# Patient Record
Sex: MALE | Race: WHITE | Employment: STUDENT | URBAN - METROPOLITAN AREA
[De-identification: names, ages, dates, MRNs, and addresses within clinical notes are randomized per-mention and may not be internally consistent; named-entity substitution may affect disease eponyms.]

---
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
## 2023-12-03 ENCOUNTER — OFFICE VISIT (OUTPATIENT)
Dept: ORTHOPEDICS | Facility: CLINIC | Age: 23
End: 2023-12-03
Payer: COMMERCIAL

## 2023-12-03 VITALS — WEIGHT: 285 LBS | HEIGHT: 78 IN | BODY MASS INDEX: 32.97 KG/M2

## 2023-12-03 DIAGNOSIS — M79.645 FINGER PAIN, LEFT: Primary | ICD-10-CM

## 2023-12-03 NOTE — LETTER
"  12/3/2023      RE: Jesus Molina  [address] [facility] [address]     Dear Colleague,    Thank you for referring your patient, Jesus Molina, to the [facility]. Please see a copy of my visit note below.    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 He felt the finger \"pop out and pop back in\" during practice today and points to the PIP joint. He remembers a similar incident to the same finger/same joint several years ago and has had some swelling/visible deformity since that injury. He believes the finger looks equivalent to this baseline deformity now.     PE:   Left hand  Small finger with radial sided swelling over the PIP joint.   Able to make a full fist without any overlapping digits.   Tender over the small finger PIP joint.   Pain with stress testing of coronal plane stability.   NVI      Imaging:   Mini c-arm used to take AP and lateral of the left small finger.   These demonstrate concentrically reduced DIP and PIP joints without any fractures. Soft tissue deformity seen over radial aspect of PIP joint.       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 He is ok to return to training and practice as he feels able to. Recommend eliceo taping of the small finger to the ring finger to protect the finger and prevent recurrent dislocations or episodes of instability.       Davida Theodore MD   Sports Fellow        Patient seen and examined with the fellow. I also personally reviewed the images and interpreted the imaging myself.     Assesment: left small digit instability    Plan: tape, splint, brace, play as able    I agree with history, physical and imaging as well as the assessment and plan as detailed by Dr. Theodore.       Again, thank you for allowing me to participate in the care of your patient.  "     Sincerely,    Rizwan Gilliland MD

## 2023-12-03 NOTE — PROGRESS NOTES
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 He felt the finger \"pop out and pop back in\" during practice today and points to the PIP joint. He remembers a similar incident to the same finger/same joint several years ago and has had some swelling/visible deformity since that injury. He believes the finger looks equivalent to this baseline deformity now.     PE:   Left hand  Small finger with radial sided swelling over the PIP joint.   Able to make a full fist without any overlapping digits.   Tender over the small finger PIP joint.   Pain with stress testing of coronal plane stability.   NVI      Imaging:   Mini c-arm used to take AP and lateral of the left small finger.   These demonstrate concentrically reduced DIP and PIP joints without any fractures. Soft tissue deformity seen over radial aspect of PIP joint.       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 He is ok to return to training and practice as he feels able to. Recommend eliceo taping of the small finger to the ring finger to protect the finger and prevent recurrent dislocations or episodes of instability.       Davida Theodore MD   Sports Fellow      "

## 2023-12-04 NOTE — PROGRESS NOTES
Patient seen and examined with the fellow. I also personally reviewed the images and interpreted the imaging myself.     Assesment: left small digit instability    Plan: tape, splint, brace, play as able    I agree with history, physical and imaging as well as the assessment and plan as detailed by Dr. Theodore.

## 2024-07-29 ENCOUNTER — TELEPHONE (OUTPATIENT)
Dept: ORTHOPEDICS | Facility: CLINIC | Age: 24
End: 2024-07-29

## 2024-07-29 DIAGNOSIS — R11.2 NAUSEA AND VOMITING, UNSPECIFIED VOMITING TYPE: Primary | ICD-10-CM

## 2024-07-29 RX ORDER — ONDANSETRON 4 MG/1
4 TABLET, ORALLY DISINTEGRATING ORAL EVERY 6 HOURS PRN
Qty: 15 TABLET | Refills: 0 | Status: SHIPPED | OUTPATIENT
Start: 2024-07-29 | End: 2024-08-05

## 2024-08-18 ENCOUNTER — ANCILLARY PROCEDURE (OUTPATIENT)
Dept: MRI IMAGING | Facility: CLINIC | Age: 24
End: 2024-08-18
Attending: ORTHOPAEDIC SURGERY
Payer: COMMERCIAL

## 2024-08-18 ENCOUNTER — ANCILLARY PROCEDURE (OUTPATIENT)
Dept: GENERAL RADIOLOGY | Facility: CLINIC | Age: 24
End: 2024-08-18
Attending: ORTHOPAEDIC SURGERY
Payer: COMMERCIAL

## 2024-08-18 DIAGNOSIS — M25.572 ACUTE LEFT ANKLE PAIN: ICD-10-CM

## 2024-08-18 DIAGNOSIS — M25.579 PAIN IN JOINT, ANKLE AND FOOT: ICD-10-CM

## 2024-08-18 DIAGNOSIS — M25.572 ACUTE LEFT ANKLE PAIN: Primary | ICD-10-CM

## 2024-08-18 DIAGNOSIS — M25.579 PAIN IN JOINT, ANKLE AND FOOT: Primary | ICD-10-CM

## 2024-08-18 PROCEDURE — 73721 MRI JNT OF LWR EXTRE W/O DYE: CPT | Mod: LT | Performed by: RADIOLOGY

## 2024-08-18 PROCEDURE — 73610 X-RAY EXAM OF ANKLE: CPT | Mod: LT | Performed by: RADIOLOGY

## 2024-08-18 PROCEDURE — 73590 X-RAY EXAM OF LOWER LEG: CPT | Mod: LT | Performed by: RADIOLOGY

## 2024-08-19 ENCOUNTER — OFFICE VISIT (OUTPATIENT)
Dept: ORTHOPEDICS | Facility: CLINIC | Age: 24
End: 2024-08-19
Payer: COMMERCIAL

## 2024-08-19 DIAGNOSIS — M25.579 PAIN IN JOINT INVOLVING ANKLE AND FOOT, UNSPECIFIED LATERALITY: Primary | ICD-10-CM

## 2024-08-19 RX ORDER — LIDOCAINE HYDROCHLORIDE 10 MG/ML
6 INJECTION, SOLUTION EPIDURAL; INFILTRATION; INTRACAUDAL; PERINEURAL
Status: SHIPPED | OUTPATIENT
Start: 2024-08-19

## 2024-08-19 RX ORDER — TRIAMCINOLONE ACETONIDE 40 MG/ML
40 INJECTION, SUSPENSION INTRA-ARTICULAR; INTRAMUSCULAR
Status: SHIPPED | OUTPATIENT
Start: 2024-08-19

## 2024-08-19 RX ADMIN — LIDOCAINE HYDROCHLORIDE 6 ML: 10 INJECTION, SOLUTION EPIDURAL; INFILTRATION; INTRACAUDAL; PERINEURAL at 15:17

## 2024-08-19 RX ADMIN — TRIAMCINOLONE ACETONIDE 40 MG: 40 INJECTION, SUSPENSION INTRA-ARTICULAR; INTRAMUSCULAR at 15:17

## 2024-08-19 NOTE — PROGRESS NOTES
Jesus Molina presents to the athletic training room today for evaluation of his left ankle.  He sustained an injury Saturday evening during the scrimmage.  I was able to review the video.  At a external rotation injury.  Was actually able to complete the practice who presented on Sunday.  We evaluated that time he was found to have significant tenderness up and down his fibula.  We elected to get radiographs and an MRI.  He returns to the clinic training room to discuss the results.    Examination of his left ankle today shows to be stable to varus valgus stress testing.  External rotation testing of the foot and ankle does reproduce his symptoms.  Drawer testing normal.  Fibula is not tender to palpation no tenderness the knee.    Plain radiographs show well reduced mortise.  No fractures.  Tib-fib films show no fractures.  MRI does show a high-grade injury to the syndesmotic ligaments both anterior, interosseous and posterior consistent with a high ankle sprain    Clinical assessment: Left ankle sprain    Plan: Long discussion with the athlete.  I did discuss with him the etiology of this injury as well as the treatment options.  Will start him on rehabilitation program through the training room.  Once he completes a functional return to play process we will allow him to return to sports when he can protect himself in the field and contribute effectively.    We did discuss the utilization of an injection as a way to help augment his recovery.  I discussed with him the pros cons he desired to proceed.    After informed consent was obtained under sterile technique 40 mg of Kenalog was injected to the point of maximal tenderness along the interosseous membrane through an anterior lateral approach.  Patient tolerated well.  Sterile dressings were applied.      Medium Joint Injection/Arthrocentesis: L ankle    Date/Time: 8/19/2024 3:17 PM    Performed by: Rizwan Gilliland MD  Authorized by: Talat  Rizwan Javier MD    Indications:  Pain  Needle Size:  21 G  Guidance: surface landmarks    Approach:  Anterolateral  Location:  Ankle  Site:  L ankle  Medications:  40 mg triamcinolone 40 MG/ML; 6 mL lidocaine (PF) 1 %  Outcome:  Tolerated well, no immediate complications  Consent Given by:  Patient  Timeout: timeout called immediately prior to procedure    Prep: patient was prepped and draped in usual sterile fashion      Addendum: Will initiate prescription for oral diclofenac.  This will take on a scheduled basis have also been given a prescription for Toradol tablets this is for game day use only.  He should not take diclofenac on these days.

## 2024-08-19 NOTE — LETTER
8/19/2024      RE: Jesus Molina  [address] [facility] [address]     Dear Colleague,    Thank you for referring your patient, Jesus Molina, to the [facility]. Please see a copy of my visit note below.    Jesus Molina presents to the athletic training room today for evaluation of his left ankle.  He sustained an injury Saturday evening during the scrimmage.  I was able to review the video.  At a external rotation injury.  Was actually able to complete the practice who presented on Sunday.  We evaluated that time he was found to have significant tenderness up and down his fibula.  We elected to get radiographs and an MRI.  He returns to the clinic training room to discuss the results.    Examination of his left ankle today shows to be stable to varus valgus stress testing.  External rotation testing of the foot and ankle does reproduce his symptoms.  Drawer testing normal.  Fibula is not tender to palpation no tenderness the knee.    Plain radiographs show well reduced mortise.  No fractures.  Tib-fib films show no fractures.  MRI does show a high-grade injury to the syndesmotic ligaments both anterior, interosseous and posterior consistent with a high ankle sprain    Clinical assessment: Left ankle sprain    Plan: Long discussion with the athlete.  I did discuss with him the etiology of this injury as well as the treatment options.  Will start him on rehabilitation program through the training room.  Once he completes a functional return to play process we will allow him to return to sports when he can protect himself in the field and contribute effectively.    We did discuss the utilization of an injection as a way to help augment his recovery.  I discussed with him the pros cons he desired to proceed.    After informed consent was obtained under sterile technique 40 mg of Kenalog was injected to the point of maximal tenderness along the interosseous membrane through  an anterior lateral approach.  Patient tolerated well.  Sterile dressings were applied.      Medium Joint Injection/Arthrocentesis: L ankle    Date/Time: 8/19/2024 3:17 PM    Performed by: Rizwan Gilliland MD  Authorized by: Rizwan Gilliland MD    Indications:  Pain  Needle Size:  21 G  Guidance: surface landmarks    Approach:  Anterolateral  Location:  Ankle  Site:  L ankle  Medications:  40 mg triamcinolone 40 MG/ML; 6 mL lidocaine (PF) 1 %  Outcome:  Tolerated well, no immediate complications  Consent Given by:  Patient  Timeout: timeout called immediately prior to procedure    Prep: patient was prepped and draped in usual sterile fashion          Again, thank you for allowing me to participate in the care of your patient.      Sincerely,    Rizwan Gilliland MD

## 2024-08-22 RX ORDER — DICLOFENAC SODIUM 75 MG/1
75 TABLET, DELAYED RELEASE ORAL 2 TIMES DAILY
Qty: 60 TABLET | Refills: 0 | Status: SHIPPED | OUTPATIENT
Start: 2024-08-22 | End: 2024-09-19

## 2024-08-22 RX ORDER — KETOROLAC TROMETHAMINE 10 MG/1
10 TABLET, FILM COATED ORAL EVERY 6 HOURS PRN
Qty: 6 TABLET | Refills: 0 | Status: SHIPPED | OUTPATIENT
Start: 2024-08-22

## 2024-08-29 ENCOUNTER — OFFICE VISIT (OUTPATIENT)
Dept: ORTHOPEDICS | Facility: CLINIC | Age: 24
End: 2024-08-29
Payer: COMMERCIAL

## 2024-08-29 DIAGNOSIS — M25.579 PAIN IN JOINT INVOLVING ANKLE AND FOOT, UNSPECIFIED LATERALITY: Primary | ICD-10-CM

## 2024-08-29 RX ADMIN — LIDOCAINE HYDROCHLORIDE 6 ML: 10 INJECTION, SOLUTION EPIDURAL; INFILTRATION; INTRACAUDAL; PERINEURAL at 14:17

## 2024-09-05 NOTE — PROGRESS NOTES
Jesus Molina presents to the athletic training room today for left ankle.  This is seen for a game day visit.  He has a high ankle sprain.  We previously discussed the possibility of doing a lidocaine injection around the point of maximal tenderness for his ankle.  He understands the pros cons risk and benefits of this injection.  Together through combined send making approach elected proceed.    After informed consent was obtained under sterile to the knee, lidocaine and Marcaine were injected the periarticular structures on the left ankle got into the ankle joint.  Tolerated this injection well.  Sterile dressings were applied.  He will take oral Toradol and we will plan to take.  He can follow-up with her  was necessary.    Medium Joint Injection/Arthrocentesis: L ankle    Date/Time: 8/29/2024 2:17 PM    Performed by: Rizwan Gilliland MD  Authorized by: Rizwan Gilliland MD    Indications:  Pain  Needle Size:  21 G  Location:  Ankle  Site:  L ankle  Medications:  6 mL lidocaine (PF) 1 %  Outcome:  Tolerated well, no immediate complications  Procedure discussed: discussed risks, benefits, and alternatives    Timeout: timeout called immediately prior to procedure    Prep: patient was prepped and draped in usual sterile fashion

## 2024-09-06 RX ORDER — LIDOCAINE HYDROCHLORIDE 10 MG/ML
6 INJECTION, SOLUTION EPIDURAL; INFILTRATION; INTRACAUDAL; PERINEURAL
Status: SHIPPED | OUTPATIENT
Start: 2024-08-29

## 2024-09-07 ENCOUNTER — OFFICE VISIT (OUTPATIENT)
Dept: ORTHOPEDICS | Facility: CLINIC | Age: 24
End: 2024-09-07
Payer: COMMERCIAL

## 2024-09-07 DIAGNOSIS — M25.572 PAIN OF JOINT OF LEFT ANKLE AND FOOT: Primary | ICD-10-CM

## 2024-09-07 RX ADMIN — BUPIVACAINE HYDROCHLORIDE 4 ML: 5 INJECTION, SOLUTION EPIDURAL; INTRACAUDAL at 07:39

## 2024-09-12 RX ORDER — BUPIVACAINE HYDROCHLORIDE 5 MG/ML
4 INJECTION, SOLUTION EPIDURAL; INTRACAUDAL
Status: SHIPPED | OUTPATIENT
Start: 2024-09-07

## 2024-09-12 NOTE — PROGRESS NOTES
Jesus Molina is a very pleasant 24-year-old male presents for a game day visit for a ankle injection.  We have been doing a left periarticular ankle injection on game days to allow him to play.  Previous imaging is shown high ankle sprain with no fractures.    Exam shows a stable ankle.  Neurovascular intact.  No signs of infection.    Clinical assessment left ankle sprain    Plan: Will consider periarticular injection    Informed consent was obtained under sterile technique Marcaine was injected to the area of maximal tenderness along the syndesmosis.  He is cleared to play.  He understands the risks.    Medium Joint Injection/Arthrocentesis: L ankle    Date/Time: 9/7/2024 7:39 AM    Performed by: Rizwan Gilliland MD  Authorized by: Rizwan Gilliland MD    Indications:  Pain  Needle Size:  21 G  Guidance: surface landmarks    Approach:  Lateral  Location:  Ankle  Site:  L ankle  Medications:  4 mL BUPivacaine (PF) 0.5 %  Procedure discussed: discussed risks, benefits, and alternatives    Timeout: timeout called immediately prior to procedure    Prep: patient was prepped and draped in usual sterile fashion

## 2024-09-14 ENCOUNTER — OFFICE VISIT (OUTPATIENT)
Dept: ORTHOPEDICS | Facility: CLINIC | Age: 24
End: 2024-09-14
Payer: COMMERCIAL

## 2024-09-14 DIAGNOSIS — S93.492D HIGH ANKLE SPRAIN OF LEFT LOWER EXTREMITY, SUBSEQUENT ENCOUNTER: Primary | ICD-10-CM

## 2024-09-14 RX ADMIN — BUPIVACAINE HYDROCHLORIDE 3 ML: 2.5 INJECTION, SOLUTION EPIDURAL; INFILTRATION; INTRACAUDAL at 12:37

## 2024-09-14 RX ADMIN — LIDOCAINE HYDROCHLORIDE 3 ML: 10 INJECTION, SOLUTION EPIDURAL; INFILTRATION; INTRACAUDAL; PERINEURAL at 12:37

## 2024-09-15 RX ORDER — BUPIVACAINE HYDROCHLORIDE 2.5 MG/ML
3 INJECTION, SOLUTION EPIDURAL; INFILTRATION; INTRACAUDAL
Status: SHIPPED | OUTPATIENT
Start: 2024-09-14 | End: 2024-09-14

## 2024-09-15 RX ORDER — LIDOCAINE HYDROCHLORIDE 10 MG/ML
3 INJECTION, SOLUTION EPIDURAL; INFILTRATION; INTRACAUDAL; PERINEURAL
Status: SHIPPED | OUTPATIENT
Start: 2024-09-14 | End: 2024-09-14

## 2024-09-15 NOTE — PROGRESS NOTES
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 He still has pain over the syndesmosis with high level athletic activity. He is interested in a pre-game syndesmotic lidocaine and marcaine injection..    Objective:   PE:  LLE: Pain to palpation over the syndesmotic insertion onto the anterior aspect of the fibula.  5/5 ankle plantar flex/dorsiflexion as well as eversion and inversion.  Mild pain with external rotation test.  Sensation tact superficial peroneal, deep peroneal, tibial nerve distribution the foot.    Procedure:   Verbal informed consent for syndesmotic injection was obtained from the patient after discussing the risk and benefits of the procedure.  Risk and benefits including but not limited to bleeding, infection, failure to cure pain and allergic reaction were discussed.  The syndesmotic insertion on the anterior aspect of the fibula was identified by palpation of bony landmarks.  The skin was cleaned with iodine solution.  Under sterile technique the anterior border the fibula was palpated.  The knee was brought down to the anterior surface of the fibula.  And the the entirety of the lidocaine and bupivicaine solution was injected.  Soft dressings were applied.  Patient tolerated the procedure without difficulty.  I counseled the patient on signs and symptoms of allergic reaction and infection.     Medium Joint Injection/Arthrocentesis: L ankle    Date/Time: 9/14/2024 12:37 PM    Performed by: Rafael Herrera MD  Authorized by: Rafael Herrera MD    Indications:  Pain  Needle Size:  21 G  Guidance: surface landmarks    Approach:  Anterolateral  Location:  Ankle  Site:  L ankle  Medications:  3 mL BUPivacaine HCl (PF) 0.25 %; 3 mL lidocaine (PF) 1 %  Outcome:  Tolerated well, no immediate complications  Procedure discussed: discussed risks, benefits, and alternatives    Timeout: timeout called immediately prior  to procedure    Prep: patient was prepped and draped in usual sterile fashion        A/P:  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 He continues to have pain over the syndesmosis effecting his ability to perform at a high level.   We discussed the risks and benefits of pregame local anesthetic injection, specifically allergic reaction, failure to cure pain, and infection.  I counseled him on the signs and symptoms of allergic reaction and infection.  I the opportunity answer his questions.  Procedure was performed as highlighted above in the training room.  He tolerated the procedure without difficulty.   He is cleared for full participation.    Rafael Herrera MD    [facility]   Department of Orthopedic Surgery

## 2024-09-19 DIAGNOSIS — M25.579 PAIN IN JOINT INVOLVING ANKLE AND FOOT, UNSPECIFIED LATERALITY: ICD-10-CM

## 2024-09-19 RX ORDER — DICLOFENAC SODIUM 75 MG/1
75 TABLET, DELAYED RELEASE ORAL 2 TIMES DAILY
Qty: 60 TABLET | Refills: 0 | Status: SHIPPED | OUTPATIENT
Start: 2024-09-19

## 2024-09-21 ENCOUNTER — APPOINTMENT (OUTPATIENT)
Dept: ORTHOPEDICS | Facility: CLINIC | Age: 24
End: 2024-09-21
Payer: COMMERCIAL

## 2024-09-28 ENCOUNTER — OFFICE VISIT (OUTPATIENT)
Dept: ORTHOPEDICS | Facility: CLINIC | Age: 24
End: 2024-09-28
Payer: COMMERCIAL

## 2024-09-28 DIAGNOSIS — M25.562 CHRONIC PAIN OF LEFT KNEE: Primary | ICD-10-CM

## 2024-09-28 DIAGNOSIS — G89.29 CHRONIC PAIN OF LEFT KNEE: Primary | ICD-10-CM

## 2024-09-28 RX ADMIN — BUPIVACAINE HYDROCHLORIDE 4 ML: 5 INJECTION, SOLUTION EPIDURAL; INTRACAUDAL at 08:02

## 2024-09-28 RX ADMIN — LIDOCAINE HYDROCHLORIDE 3 ML: 10 INJECTION, SOLUTION EPIDURAL; INFILTRATION; INTRACAUDAL; PERINEURAL at 08:02

## 2024-09-30 RX ORDER — BUPIVACAINE HYDROCHLORIDE 5 MG/ML
4 INJECTION, SOLUTION EPIDURAL; INTRACAUDAL
Status: SHIPPED | OUTPATIENT
Start: 2024-09-28

## 2024-09-30 RX ORDER — LIDOCAINE HYDROCHLORIDE 10 MG/ML
3 INJECTION, SOLUTION EPIDURAL; INFILTRATION; INTRACAUDAL; PERINEURAL
Status: SHIPPED | OUTPATIENT
Start: 2024-09-28

## 2024-09-30 NOTE — PROGRESS NOTES
I the opportunity to see Kang before the game.  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  We have been doing a left periarticular ankle injection prior to the football game.  After imaging is demonstrated no fractures.  He returns for another injection.    Examination today shows stable ankle.  Skin is intact.    Clinical assessment left ankle sprain    Plan: Will do lidocaine Marcaine injection.    Informed consent was obtained under sterile technique lidocaine Marcaine were injected the point of maximal tenderness along the high ankle.  Patient tolerated injection well.  Sterile dressings were applied.  Medium Joint Injection/Arthrocentesis    Date/Time: 9/28/2024 8:02 AM    Performed by: Rizwan Gilliland MD  Authorized by: Rizwan Gilliland MD    Indications:  Pain  Needle Size:  21 G  Guidance: surface landmarks    Approach:  Lateral  Location:  Ankle  Medications:  4 mL BUPivacaine (PF) 0.5 %; 3 mL lidocaine (PF) 1 %  Outcome:  Tolerated well, no immediate complications  Procedure discussed: discussed risks, benefits, and alternatives    Consent Given by:  Patient  Timeout: timeout called immediately prior to procedure    Prep: patient was prepped and draped in usual sterile fashion

## 2024-10-05 ENCOUNTER — OFFICE VISIT (OUTPATIENT)
Dept: ORTHOPEDICS | Facility: CLINIC | Age: 24
End: 2024-10-05
Payer: COMMERCIAL

## 2024-10-05 DIAGNOSIS — S93.492D HIGH ANKLE SPRAIN OF LEFT LOWER EXTREMITY, SUBSEQUENT ENCOUNTER: Primary | ICD-10-CM

## 2024-10-05 RX ADMIN — BUPIVACAINE HYDROCHLORIDE 3 ML: 2.5 INJECTION, SOLUTION EPIDURAL; INFILTRATION; INTRACAUDAL at 10:30

## 2024-10-05 RX ADMIN — LIDOCAINE HYDROCHLORIDE 3 ML: 10 INJECTION, SOLUTION INFILTRATION; PERINEURAL at 10:30

## 2024-10-06 ENCOUNTER — OFFICE VISIT (OUTPATIENT)
Dept: FAMILY MEDICINE | Facility: CLINIC | Age: 24
End: 2024-10-06
Payer: COMMERCIAL

## 2024-10-06 DIAGNOSIS — S01.81XA FACIAL LACERATION, INITIAL ENCOUNTER: Primary | ICD-10-CM

## 2024-10-08 RX ORDER — LIDOCAINE HYDROCHLORIDE 10 MG/ML
3 INJECTION, SOLUTION INFILTRATION; PERINEURAL
Status: SHIPPED | OUTPATIENT
Start: 2024-10-05

## 2024-10-08 RX ORDER — BUPIVACAINE HYDROCHLORIDE 2.5 MG/ML
3 INJECTION, SOLUTION EPIDURAL; INFILTRATION; INTRACAUDAL
Status: SHIPPED | OUTPATIENT
Start: 2024-10-05

## 2024-10-08 NOTE — PROGRESS NOTES
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 He still has pain over the syndesmosis with high level athletic activity. He is interested in a pre-game syndesmotic lidocaine and marcaine injection..     Objective:   PE:  LLE: Pain to palpation over the syndesmotic insertion onto the anterior aspect of the fibula.  5/5 ankle plantar flex/dorsiflexion as well as eversion and inversion.  Mild pain with external rotation test.  Sensation tact superficial peroneal, deep peroneal, tibial nerve distribution the foot.     Procedure:   Verbal informed consent for syndesmotic injection was obtained from the patient after discussing the risk and benefits of the procedure.  Risk and benefits including but not limited to bleeding, infection, failure to cure pain and allergic reaction were discussed.  The syndesmotic insertion on the anterior aspect of the fibula was identified by palpation of bony landmarks.  The skin was cleaned with iodine solution.  Under sterile technique the anterior border the fibula was palpated.  The knee was brought down to the anterior surface of the fibula.  And the the entirety of the lidocaine and bupivicaine solution was injected.  Soft dressings were applied.  Patient tolerated the procedure without difficulty.  I counseled the patient on signs and symptoms of allergic reaction and infection.    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 He continues to have pain over the syndesmosis effecting his ability to perform at a high level.   We discussed the risks and benefits of pregame local anesthetic injection, specifically allergic reaction, failure to cure pain, and infection.  I counseled him on the signs and symptoms of allergic reaction and infection.  I the opportunity answer his questions.  Procedure was performed as highlighted above in the training room.  He tolerated the procedure without difficulty.   He is cleared for full participation.     Rafael Herrera MD    [facility]   Department of Orthopedic Surgery

## 2024-10-12 ENCOUNTER — OFFICE VISIT (OUTPATIENT)
Dept: ORTHOPEDICS | Facility: CLINIC | Age: 24
End: 2024-10-12
Payer: COMMERCIAL

## 2024-10-12 DIAGNOSIS — S93.492D HIGH ANKLE SPRAIN OF LEFT LOWER EXTREMITY, SUBSEQUENT ENCOUNTER: Primary | ICD-10-CM

## 2024-10-12 RX ADMIN — LIDOCAINE HYDROCHLORIDE 6 ML: 10 INJECTION, SOLUTION EPIDURAL; INFILTRATION; INTRACAUDAL; PERINEURAL at 12:50

## 2024-10-14 RX ORDER — LIDOCAINE HYDROCHLORIDE 10 MG/ML
6 INJECTION, SOLUTION EPIDURAL; INFILTRATION; INTRACAUDAL; PERINEURAL
Status: COMPLETED | OUTPATIENT
Start: 2024-10-12 | End: 2024-10-12

## 2024-10-14 NOTE — PROGRESS NOTES
Jesus Molina presents the athletic training room today for a injection into his left ankle pregame.  We have done this in the past few weeks.  We discussed again the pros cons risk and benefits.  Imaging previously showed no fractures.    Exam today shows overall his ankle stable.    Clinical assessment left high ankle sprain    Plan: Lidocaine injected in the left ankle    After informed consent was obtained under sterile technique 6 cc of lidocaine were injected out complication of left high ankle and the point of maximal tenderness.  Patient tolerated the injection well.  Sterile dressings were applied.    Medium Joint Injection/Arthrocentesis: L ankle    Date/Time: 10/12/2024 12:50 PM    Performed by: Rizwan Gilliland MD  Authorized by: Rizwan Gilliland MD    Indications:  Pain  Needle Size:  21 G  Guidance: surface landmarks    Approach:  Anterolateral  Location:  Ankle  Site:  L ankle  Medications:  6 mL lidocaine (PF) 1 %  Outcome:  Tolerated well, no immediate complications  Procedure discussed: discussed risks, benefits, and alternatives    Consent Given by:  Patient  Timeout: timeout called immediately prior to procedure    Prep: patient was prepped and draped in usual sterile fashion

## 2024-10-17 NOTE — PROGRESS NOTES
Jesus Molina is a 24 year old male who presents to the clinic with a laceration on the face sustained 2 hours ago.  This is a non-work related injury.  Mechanism of injury: helmet injury during football.    Associated symptoms: Denies numbness, weakness, or loss of function  Last tetanus booster within 10 years: yes    Patient Active Problem List   Diagnosis    ADHD (attention deficit hyperactivity disorder)       Social History     Socioeconomic History    Marital status: Single     Spouse name: Not on file    Number of children: Not on file    Years of education: Not on file    Highest education level: Not on file   Occupational History    Not on file   Tobacco Use    Smoking status: Never    Smokeless tobacco: Never   Substance and Sexual Activity    Alcohol use: No    Drug use: No    Sexual activity: Never   Other Topics Concern     Service No    Blood Transfusions No    Caffeine Concern Not Asked    Occupational Exposure Not Asked    Hobby Hazards Not Asked    Sleep Concern Not Asked    Stress Concern Not Asked    Weight Concern Not Asked    Special Diet Not Asked    Back Care Not Asked    Exercise Not Asked    Bike Helmet Yes    Seat Belt Yes    Self-Exams Not Asked   Social History Narrative    Not on file     Social Determinants of Health     Financial Resource Strain: Not on file   Food Insecurity: Not on file   Transportation Needs: Not on file   Physical Activity: Not on file   Stress: Not on file   Social Connections: Not on file   Interpersonal Safety: Low Risk  (10/6/2024)    Interpersonal Safety     Do you feel physically and emotionally safe where you currently live?: Yes     Within the past 12 months, have you been hit, slapped, kicked or otherwise physically hurt by someone?: No     Within the past 12 months, have you been humiliated or emotionally abused in other ways by your partner or ex-partner?: No   Housing Stability: Not on file       Current Outpatient Medications   Medication  Sig Dispense Refill    diclofenac (VOLTAREN) 75 MG EC tablet TAKE 1 TABLET BY MOUTH TWICE A DAY 60 tablet 0    diphenhydrAMINE (BENADRYL) 25 MG capsule Take 25 mg by mouth every 6 hours as needed.      ketorolac (TORADOL) 10 MG tablet Take 1 tablet (10 mg) by mouth every 6 hours as needed for moderate pain. 6 tablet 0    methylphenidate (CONCERTA) 18 MG CR tablet Take 1 tablet (18 mg) by mouth every morning 30 tablet 0       EXAM: The patient appears today in alert distress  VITALS: There were no vitals taken for this visit.    Size of laceration: 1 centimeters  Characteristics of the laceration: bleeding- mild  Tendon function intact: yes  Sensation to light touch intact: yes  Pulses intact: yes    Picture included in patient's chart: no    Assessment:  1 centimeter laceration    PLAN:  Wound was locally injected with 1 cc's of Lidocaine 1% plain  Prepped and draped in the usual sterile fashion  Wound irrigated  Laceration was closed using 5  5-0 nylon interrupted sutures  After care instructions:  Keep wound clean and dry for the next 24-48 hours  Discussed the probability of scarring  I was assisted by Dr Art Horton in this laceration repair.  Will re-evaluate him in 5-7 days for suture removal  Dr Britton

## 2024-10-26 ENCOUNTER — OFFICE VISIT (OUTPATIENT)
Dept: ORTHOPEDICS | Facility: CLINIC | Age: 24
End: 2024-10-26
Payer: COMMERCIAL

## 2024-10-26 DIAGNOSIS — S93.492D HIGH ANKLE SPRAIN OF LEFT LOWER EXTREMITY, SUBSEQUENT ENCOUNTER: Primary | ICD-10-CM

## 2024-10-26 RX ADMIN — LIDOCAINE HYDROCHLORIDE 3 ML: 10 INJECTION, SOLUTION EPIDURAL; INFILTRATION; INTRACAUDAL; PERINEURAL at 13:00

## 2024-10-26 RX ADMIN — BUPIVACAINE HYDROCHLORIDE 3 ML: 2.5 INJECTION, SOLUTION EPIDURAL; INFILTRATION; INTRACAUDAL at 13:00

## 2024-10-28 RX ORDER — LIDOCAINE HYDROCHLORIDE 10 MG/ML
3 INJECTION, SOLUTION EPIDURAL; INFILTRATION; INTRACAUDAL; PERINEURAL
Status: COMPLETED | OUTPATIENT
Start: 2024-10-26 | End: 2024-10-26

## 2024-10-28 RX ORDER — BUPIVACAINE HYDROCHLORIDE 2.5 MG/ML
3 INJECTION, SOLUTION EPIDURAL; INFILTRATION; INTRACAUDAL
Status: COMPLETED | OUTPATIENT
Start: 2024-10-26 | End: 2024-10-26

## 2024-10-29 NOTE — PROGRESS NOTES
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 He still has pain over the syndesmosis with high level athletic activity. He is interested in a pre-game syndesmotic lidocaine and marcaine injection..     Objective:   PE:  LLE: Pain to palpation over the syndesmotic insertion onto the anterior aspect of the fibula.  5/5 ankle plantar flex/dorsiflexion as well as eversion and inversion.  Mild pain with external rotation test.  Sensation tact superficial peroneal, deep peroneal, tibial nerve distribution the foot.     Procedure:   Verbal informed consent for syndesmotic injection was obtained from the patient after discussing the risk and benefits of the procedure.  Risk and benefits including but not limited to bleeding, infection, failure to cure pain and allergic reaction were discussed.  The syndesmotic insertion on the anterior aspect of the fibula was identified by palpation of bony landmarks.  The skin was cleaned with iodine solution.  Under sterile technique the anterior border the fibula was palpated.  The knee was brought down to the anterior surface of the fibula.  And the the entirety of the lidocaine and bupivicaine solution was injected.  Soft dressings were applied.  Patient tolerated the procedure without difficulty.  I counseled the patient on signs and symptoms of allergic reaction and infection.     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 He continues to have pain over the syndesmosis effecting his ability to perform at a high level.   We discussed the risks and benefits of pregame local anesthetic injection, specifically allergic reaction, failure to cure pain, and infection.  I counseled him on the signs and symptoms of allergic reaction and infection.  I the opportunity answer his questions.  Procedure was performed as highlighted above in the training room.  He tolerated the procedure without difficulty.   He is cleared for full participation.     Rafael Herrera MD    [facility]   Department of Orthopedic Surgery

## 2024-10-31 ENCOUNTER — TELEPHONE (OUTPATIENT)
Dept: ORTHOPEDICS | Facility: CLINIC | Age: 24
End: 2024-10-31

## 2024-10-31 DIAGNOSIS — M25.572 ACUTE LEFT ANKLE PAIN: Primary | ICD-10-CM

## 2024-10-31 RX ORDER — KETOROLAC TROMETHAMINE 10 MG/1
10 TABLET, FILM COATED ORAL EVERY 6 HOURS PRN
Qty: 6 TABLET | Refills: 0 | Status: SHIPPED | OUTPATIENT
Start: 2024-10-31

## 2024-11-02 ENCOUNTER — OFFICE VISIT (OUTPATIENT)
Dept: ORTHOPEDICS | Facility: CLINIC | Age: 24
End: 2024-11-02
Payer: COMMERCIAL

## 2024-11-02 DIAGNOSIS — S93.492D HIGH ANKLE SPRAIN OF LEFT LOWER EXTREMITY, SUBSEQUENT ENCOUNTER: Primary | ICD-10-CM

## 2024-11-02 RX ADMIN — LIDOCAINE HYDROCHLORIDE 6 ML: 10 INJECTION, SOLUTION EPIDURAL; INFILTRATION; INTRACAUDAL; PERINEURAL at 10:45

## 2024-11-03 RX ORDER — LIDOCAINE HYDROCHLORIDE 10 MG/ML
6 INJECTION, SOLUTION EPIDURAL; INFILTRATION; INTRACAUDAL; PERINEURAL
Status: COMPLETED | OUTPATIENT
Start: 2024-11-02 | End: 2024-11-02

## 2024-11-03 NOTE — PROGRESS NOTES
Shakir Lopez very pleasant 24-year-old male.  College football player.  Been struggling with a left high ankle sprain.  We have been doing game day lidocaine injections.  He comes in today for another injection.    Exam today is unchanged tenderness of the anterior syndesmosis.  Otherwise neurovascularly intact and ankle stable.    Clinical assessment left ankle sprain    Plan: Lidocaine/Marcaine injection.    After informed consent was obtained under sterile technique 6 cc of lidocaine were injected without complication to the left syndesmosis    Medium Joint Injection/Arthrocentesis: L ankle    Date/Time: 11/2/2024 10:45 AM    Performed by: Rizwan Gilliland MD  Authorized by: Rizwan Gilliland MD    Indications:  Pain  Needle Size:  21 G  Guidance: surface landmarks    Approach:  Anterolateral  Location:  Ankle  Site:  L ankle  Medications:  6 mL lidocaine (PF) 1 %  Outcome:  Tolerated well, no immediate complications  Procedure discussed: discussed risks, benefits, and alternatives    Consent Given by:  Patient  Timeout: timeout called immediately prior to procedure    Prep: patient was prepped and draped in usual sterile fashion